# Patient Record
Sex: FEMALE | Race: WHITE | NOT HISPANIC OR LATINO | URBAN - METROPOLITAN AREA
[De-identification: names, ages, dates, MRNs, and addresses within clinical notes are randomized per-mention and may not be internally consistent; named-entity substitution may affect disease eponyms.]

---

## 2024-05-15 ENCOUNTER — OFFICE VISIT (OUTPATIENT)
Dept: PRIMARY CARE | Facility: EXTERNAL LOCATION | Age: 59
End: 2024-05-15
Payer: COMMERCIAL

## 2024-05-15 VITALS
TEMPERATURE: 97.6 F | BODY MASS INDEX: 32.59 KG/M2 | DIASTOLIC BLOOD PRESSURE: 86 MMHG | WEIGHT: 184 LBS | SYSTOLIC BLOOD PRESSURE: 132 MMHG | RESPIRATION RATE: 16 BRPM | HEART RATE: 77 BPM | OXYGEN SATURATION: 97 %

## 2024-05-15 DIAGNOSIS — R82.90 ABNORMAL URINE: Primary | ICD-10-CM

## 2024-05-15 PROBLEM — M54.50 BILATERAL LOW BACK PAIN WITHOUT SCIATICA: Status: ACTIVE | Noted: 2022-08-30

## 2024-05-15 PROBLEM — F41.1 ANXIETY STATE: Status: ACTIVE | Noted: 2017-04-27

## 2024-05-15 LAB
POC APPEARANCE, URINE: CLEAR
POC BILIRUBIN, URINE: NEGATIVE
POC BLOOD, URINE: NEGATIVE
POC COLOR, URINE: COLORLESS
POC GLUCOSE, URINE: NEGATIVE MG/DL
POC KETONES, URINE: NEGATIVE MG/DL
POC LEUKOCYTES, URINE: ABNORMAL
POC NITRITE,URINE: NEGATIVE
POC PH, URINE: 7 PH
POC PROTEIN, URINE: NEGATIVE MG/DL
POC SPECIFIC GRAVITY, URINE: 1.01
POC UROBILINOGEN, URINE: 0.2 EU/DL

## 2024-05-15 PROCEDURE — 87086 URINE CULTURE/COLONY COUNT: CPT | Mod: WESLAB | Performed by: NURSE PRACTITIONER

## 2024-05-15 RX ORDER — ERGOCALCIFEROL (VITAMIN D2) 50 MCG
CAPSULE ORAL DAILY
COMMUNITY

## 2024-05-15 RX ORDER — LEVOTHYROXINE SODIUM 125 UG/1
1 TABLET ORAL
COMMUNITY
Start: 2024-03-04 | End: 2024-08-31

## 2024-05-15 RX ORDER — MAGNESIUM OXIDE 300 MG
TABLET ORAL
COMMUNITY

## 2024-05-15 ASSESSMENT — ENCOUNTER SYMPTOMS
NAUSEA: 0
FEVER: 0
FREQUENCY: 1
CHILLS: 0
DYSURIA: 0
VOMITING: 0
FLANK PAIN: 0
ABDOMINAL PAIN: 0
SHORTNESS OF BREATH: 0

## 2024-05-15 NOTE — PROGRESS NOTES
"Subjective   Patient ID: Lolis Reid is a 58 y.o. female who presents for UTI (Strong smell), Urinary Frequency, and Abdominal Pain.    HPI:  Complains of strong smelling urine. Also with some urinary frequency.Has been drinking more water.   Very mild abdominal pressure but states \"unsure if just my anxiety because my jeans are tight as well\".   No back pain.   No fever, chills, nausea or vomiting.   No vaginal symptoms.   No concern for STD.     Did eat asparagus 2 days before abnormal urine smell started. Just wanted to make sure not UTI.     Allergies   Allergen Reactions    Sulfamethoxazole-Trimethoprim Hives       Current Outpatient Medications on File Prior to Visit   Medication Sig    ergocalciferol (Vitamin D-2) 50 MCG (2000 UT) capsule capsule Take by mouth once daily.    levothyroxine (Synthroid, Levoxyl) 125 mcg tablet Take 1 tablet (125 mcg) by mouth once daily in the morning. Take before meals.    magnesium oxide 300 mg magnesium tablet Take by mouth.     No current facility-administered medications on file prior to visit.        Past Medical History:   Diagnosis Date    Bilateral low back pain without sciatica 08/30/2022    CHUNG (generalized anxiety disorder) 03/26/2012    Hypothyroidism 03/26/2012    Submucous leiomyoma of uterus 02/18/2016    White coat syndrome with high blood pressure but without hypertension 03/26/2012       Past Surgical History:   Procedure Laterality Date    CERVICAL POLYPECTOMY      MYOMECTOMY         Review of Systems   Constitutional:  Negative for chills and fever.   Respiratory:  Negative for shortness of breath.    Cardiovascular:  Negative for chest pain.   Gastrointestinal:  Negative for abdominal pain, nausea and vomiting.   Genitourinary:  Positive for frequency. Negative for dysuria and flank pain.        Claims abnormal urine sell.        Objective   Visit Vitals  /86   Pulse 77   Temp 36.4 °C (97.6 °F)   Resp 16   Wt 83.5 kg (184 lb)   SpO2 97%   BMI " 32.59 kg/m²   OB Status Menopausal   Smoking Status Never   BSA 1.93 m²       Office Visit on 05/15/2024   Component Date Value Ref Range Status    POC Color, Urine 05/15/2024 Colorless (A)  Straw, Yellow, Light-Yellow Final    POC Appearance, Urine 05/15/2024 Clear  Clear Final    POC Glucose, Urine 05/15/2024 NEGATIVE  NEGATIVE mg/dl Final    POC Bilirubin, Urine 05/15/2024 NEGATIVE  NEGATIVE Final    POC Ketones, Urine 05/15/2024 NEGATIVE  NEGATIVE mg/dl Final    POC Specific Gravity, Urine 05/15/2024 1.010  1.005 - 1.035 Final    POC Blood, Urine 05/15/2024 NEGATIVE  NEGATIVE Final    POC PH, Urine 05/15/2024 7.0  No Reference Range Established PH Final    POC Protein, Urine 05/15/2024 NEGATIVE  NEGATIVE, 30 (1+) mg/dl Final    POC Urobilinogen, Urine 05/15/2024 0.2  0.2, 1.0 EU/DL Final    Poc Nitrite, Urine 05/15/2024 NEGATIVE  NEGATIVE Final    POC Leukocytes, Urine 05/15/2024 TRACE (A)  NEGATIVE Final       Physical Exam  Constitutional:       General: She is not in acute distress.     Appearance: Normal appearance. She is normal weight. She is not ill-appearing or toxic-appearing.   Pulmonary:      Effort: Pulmonary effort is normal.      Breath sounds: Normal breath sounds.   Abdominal:      General: Abdomen is flat. There is no distension.      Palpations: Abdomen is soft.      Tenderness: There is no abdominal tenderness. There is no right CVA tenderness, left CVA tenderness, guarding or rebound.   Skin:     General: Skin is warm.   Neurological:      General: No focal deficit present.      Mental Status: She is alert.   Psychiatric:         Mood and Affect: Mood normal.         Behavior: Behavior normal.         Thought Content: Thought content normal.         Assessment/Plan   Diagnoses and all orders for this visit:  Abnormal urine  -     POCT UA Automated manually resulted  -     Urine Culture    - Discussed urinalysis results. Low suspicion for UTI.   - No abnormal smell noted in urine. Suspect  smell may have been from vitamins or asparagus.   - Will send urine culture and treat if indicated.   - Follow up with PCP in 3 days if symptoms persist. Please be seen sooner with concerns or worsening symptoms.

## 2024-05-15 NOTE — PATIENT INSTRUCTIONS
DISCHARGE INSTRUCTIONS  - Follow up with your primary care provider in 3 days if no improvement.   - Return to clinic or go to urgent care sooner with concerns or worsening symptoms.     WHAT CARE IS NEEDED AT HOME  - To lower your chance of getting a UTI in the future, you can:  Drink extra fluids.  If you have sex, urinate right afterwards.  - Practice good hygiene. Wipe from front to back after going to the toilet.  - Do not use scented tampons, soap, or toilet paper.  - Keep your genital area clean. Wash daily with soap and water.  - Take showers instead of tub baths.  - Do not use douches or genital hygiene sprays.      CALL YOUR DOCTOR OR GO TO URGENT CARE IF:  Signs of worsening  infection. These include a fever of 100.4°F (38°C) or higher, chills, back pain, nausea, throwing up, or bloody urine.  Symptoms come back or get worse.   You are not able to urinate for more than 8 hours.

## 2024-05-17 LAB — BACTERIA UR CULT: NORMAL
